# Patient Record
Sex: FEMALE | Race: WHITE | Employment: FULL TIME | URBAN - METROPOLITAN AREA
[De-identification: names, ages, dates, MRNs, and addresses within clinical notes are randomized per-mention and may not be internally consistent; named-entity substitution may affect disease eponyms.]

---

## 2019-10-02 ENCOUNTER — HOSPITAL ENCOUNTER (OUTPATIENT)
Age: 49
Discharge: HOME | End: 2019-10-02
Attending: EMERGENCY MEDICINE
Payer: COMMERCIAL

## 2019-10-02 VITALS
OXYGEN SATURATION: 99 % | RESPIRATION RATE: 18 BRPM | HEART RATE: 91 BPM | TEMPERATURE: 98 F | WEIGHT: 175 LBS | BODY MASS INDEX: 29.16 KG/M2 | SYSTOLIC BLOOD PRESSURE: 124 MMHG | DIASTOLIC BLOOD PRESSURE: 86 MMHG | HEIGHT: 65 IN

## 2019-10-02 DIAGNOSIS — J01.00 ACUTE NON-RECURRENT MAXILLARY SINUSITIS: Primary | ICD-10-CM

## 2019-10-02 PROCEDURE — 99204 OFFICE O/P NEW MOD 45 MIN: CPT

## 2019-10-02 PROCEDURE — 99203 OFFICE O/P NEW LOW 30 MIN: CPT

## 2019-10-02 RX ORDER — AZITHROMYCIN 250 MG/1
TABLET, FILM COATED ORAL
Qty: 1 PACKAGE | Refills: 0 | Status: SHIPPED | OUTPATIENT
Start: 2019-10-02

## 2019-10-02 NOTE — ED PROVIDER NOTES
Patient Seen in: [facility] In [address] [place] [address]    History   Patient presents with:  Cough/URI    Stated Complaint: sinus congestion    HPI      HISTORY OF PRESENT ILLNESS:Patient complains of URI symptoms for 3-4 days.   Complains of sinus [10/02/19 1128]   /86   Pulse 91   Resp 18   Temp 98.2 °F (36.8 °C)   Temp src Oral   SpO2 99 %   O2 Device None (Room air)       Current:/86   Pulse 91   Temp 98.2 °F (36.8 °C) (Oral)   Resp 18   Ht 165.1 cm (5' 5\")   Wt 79.4 kg   LMP 06/13/2

## 2024-04-01 ENCOUNTER — OFFICE VISIT (OUTPATIENT)
Dept: RHEUMATOLOGY | Facility: CLINIC | Age: 54
End: 2024-04-01
Payer: COMMERCIAL

## 2024-04-01 ENCOUNTER — LAB ENCOUNTER (OUTPATIENT)
Dept: LAB | Facility: HOSPITAL | Age: 54
End: 2024-04-01
Attending: INTERNAL MEDICINE
Payer: COMMERCIAL

## 2024-04-01 VITALS
WEIGHT: 172 LBS | SYSTOLIC BLOOD PRESSURE: 138 MMHG | DIASTOLIC BLOOD PRESSURE: 86 MMHG | HEIGHT: 65 IN | BODY MASS INDEX: 28.66 KG/M2 | HEART RATE: 53 BPM

## 2024-04-01 DIAGNOSIS — M79.642 BILATERAL HAND PAIN: ICD-10-CM

## 2024-04-01 DIAGNOSIS — Z13.820 OSTEOPOROSIS SCREENING: ICD-10-CM

## 2024-04-01 DIAGNOSIS — M79.641 BILATERAL HAND PAIN: ICD-10-CM

## 2024-04-01 DIAGNOSIS — Z13.820 OSTEOPOROSIS SCREENING: Primary | ICD-10-CM

## 2024-04-01 LAB
ALP LIVER SERPL-CCNC: 83 U/L
CALCIUM BLD-MCNC: 10 MG/DL (ref 8.7–10.4)
IGA SERPL-MCNC: 152.2 MG/DL (ref 70–312)
PTH-INTACT SERPL-MCNC: 59.8 PG/ML (ref 18.5–88)
VIT D+METAB SERPL-MCNC: 38.8 NG/ML (ref 30–100)

## 2024-04-01 PROCEDURE — 83521 IG LIGHT CHAINS FREE EACH: CPT

## 2024-04-01 PROCEDURE — 82306 VITAMIN D 25 HYDROXY: CPT

## 2024-04-01 PROCEDURE — 86364 TISS TRNSGLTMNASE EA IG CLAS: CPT

## 2024-04-01 PROCEDURE — 84075 ASSAY ALKALINE PHOSPHATASE: CPT

## 2024-04-01 PROCEDURE — 86334 IMMUNOFIX E-PHORESIS SERUM: CPT

## 2024-04-01 PROCEDURE — 82310 ASSAY OF CALCIUM: CPT

## 2024-04-01 PROCEDURE — 83970 ASSAY OF PARATHORMONE: CPT

## 2024-04-01 PROCEDURE — 84165 PROTEIN E-PHORESIS SERUM: CPT

## 2024-04-01 PROCEDURE — 82784 ASSAY IGA/IGD/IGG/IGM EACH: CPT

## 2024-04-01 PROCEDURE — 36415 COLL VENOUS BLD VENIPUNCTURE: CPT

## 2024-04-01 NOTE — PROGRESS NOTES
Maribell Blackburn is a 53 year old female who presents for   Chief Complaint   Patient presents with    Consult    Joint Pain     Selvin Hand joint pain    .   HPI:   CC: joint pain, evaluate for osteoporosis   Consult: self-referred     This is a 52 yo F with hx of Hysterectomy due to Endometriosis in 2016 presents to evaluate for osteoporosis.  She had a hysterectomy in 2016 and was on oral replacement therapy for 2 months.  No prior history of fractures.  No family history of osteoporosis.  Her friend was received diagnosed with osteoporosis and she is concerned if she has this.  She is vegan but consumes a lot of vegetables that have calcium.  She was doing yoga but recently started doing more weightbearing exercises.  She reports some pain in her hands but not severe.  No swelling.  No history of psoriasis.  No back pain.    Osteoporosis screening questions:  History of fractures: no   Family history of fractures/osteoporosis:  no   History of kidney stones: hx of kidney stones, more than 10 years ago  Menopause at age: had hysterectomy in 2016, did HRT for 2 mos   History of steroid use: no  On anticonvulsants: no  Calcium/Vit D intake/supplementation: takes Ca and vitamin D supplements, vegan diet and consume a lot of green vegetable, no dairy products   Weight bearing exercise: exercise, interval training on treadmill and weights   Alcohol consumption: minimal   Tobacco use: no   Last dental: no  Height loss: no   Falls in the past 12 months: no   Hx external beam radiation to bones: no   Currently taking nothing for bone rx.   Other therapies have included: none        Wt Readings from Last 2 Encounters:   04/01/24 172 lb (78 kg)   10/02/19 175 lb (79.4 kg)     Body mass index is 28.62 kg/m².      Current Outpatient Medications   Medication Sig Dispense Refill    Saline (AYR NASAL MIST ALLERGY/SINUS) 2.65 % Nasal Solution 1 spray by Nasal route 3 (three) times daily. 50 mL 0    azithromycin (ZITHROMAX Z-QUANG)  250 MG Oral Tab 500 mg once followed by 250 mg daily x 4 days 1 Package 0    estradiol 1 MG Oral Tab Take 1 tablet (1 mg total) by mouth daily. 30 tablet 11      Past Medical History:   Diagnosis Date    Menometrorrhagia 2009    endometrial ablation - novasure    Sterilization 11/11/2004      Past Surgical History:   Procedure Laterality Date    ENDOMETRIAL ABLATION  2009    novasure    OTHER SURGICAL HISTORY  2004    Per NG  Sterilization      Family History   Problem Relation Age of Onset    Diabetes Father     Arthritis Father         osteoarthritis    Cancer Cousin 37        lung-smoker, \"Cousin-raised as si\"    Hypertension Mother       Social History:  Social History     Socioeconomic History    Marital status:    Tobacco Use    Smoking status: Never    Smokeless tobacco: Never   Substance and Sexual Activity    Alcohol use: No     Alcohol/week: 0.0 standard drinks of alcohol     Comment: None.     Drug use: No     Comment: None.     Sexual activity: Yes     Partners: Male     Birth control/protection: Tubal Ligation   Other Topics Concern    Caffeine Concern Yes     Comment: coffee, 3 cups daily           REVIEW OF SYSTEMS:   Review Of Systems:  Constitutional: No fever, no change in weight or appetitie  Derm: No rashes, no oral ulcers, no alopecia, no photosensitivity, no psoriasis  HEENT: No dry eyes, no dry mouth, no Raynaud's, no nasal ulcers, no parotid swelling, no neck pain, no jaw pain, no temple pain  Eyes: No visual changes,   CVS: No chest pain, no heart disease  RS: No SOB, no Cough, No Pleurtic pain,   GI: No nausea, no vomiiting, no abominal pain, no hx of ulcer, no gastritis, no heartburn, no dyshpagia, no BRBPR or melena  : no dysuria, no hx of miscarriages, no DVT Hx, no hx of OCP,   Neuro: No numbness or tingling, no headache, no hx of seizures,   Psych: no hx of anxiety or depression  ENDO: no hx of thyroid disease, no hx of DM  Joint/Muscluskeltal: see HPI,   All other ROS are  negative.     EXAM:   Ht 5' 5\" (1.651 m)   Wt 172 lb (78 kg)   LMP 06/13/2016 (Within Days)   BMI 28.62 kg/m²   GEN: AAOx3, NAD  HEENT: EOMI, PERRLA, no injection or icterus, oral mucosa moist, no oral lesions. No lymphadenopathy. No facial rash  CVS: RRR, no murmurs rubs or gallops. Equal 2+ distal pulses.   LUNGS: CTAB, no increased work of breathing  ABDOMEN:  soft NT/ND, +BS, no HSM  SKIN: No rashes or skin lesions. No nail findings  MSK:  Cervical spine: FROM  Hands: no synovitis in DIP, PIP and MCP, strong full fists  Wrist: FROM, no pain or swelling or warmth on palpation  Elbow: FROM, no pain or swelling or warmth on palpation  Shoulders: FROM, no pain or swelling or warmth on palpation  Hip: normal log roll, no lateral hip pain, NIALL test negative b/l  Knees: FROM, no warmth or effusion present. No pain with ROM.   Ankles: FROM, no pain or swelling or warmth on palpation  Feet: no pain with MTP squeeze, no toe swelling or pain or warmth on palpation with FROM  Spine: no lumbar or sacral pain on palpation.  NEURO: Cranial nerves II-XII intact grossly. 5/5 strength throughout in both upper and lower extremities, sensation intact.  PSYCH: normal mood    LABS:     None    IMAGING:     Reviewed     ASSESSMENT AND PLAN:     Osteoporosis screening  - She had hysterectomy in 2016 and was on home replacement therapy for 2 months.  She is vegan but does consume vegetables to have calcium.  She does weightbearing exercises.  - Plan to obtain further blood work  - We will order a bone density  - Advised to start taking calcium 6 or milligrams daily and vitamin D 2000 units daily.  She will continue weightbearing exercises    Polyarthralgias, mostly her hands  - No evidence of inflammatory arthritis, synovitis on exam.    Thank you for allowing me to participate in this patients care. Pt will be notified of results and to follow-up as needed    Ilda Solano MD  4/1/2024  10:50 AM

## 2024-04-01 NOTE — PATIENT INSTRUCTIONS
You were seen today for evaluation of osteoporosis and some joint pain  Lets get further test to evaluate for osteoporosis  Will also have you get a bone density  Depending the results we will discuss treatment options if there is any evidence of osteoporosis  Take at least 500 to 600 mg of calcium supplement today and around 2000 units of vitamin D  Continue weightbearing exercises which helps build bone

## 2024-04-02 LAB
ALBUMIN SERPL ELPH-MCNC: 4.41 G/DL (ref 3.75–5.21)
ALBUMIN/GLOB SERPL: 1.92 {RATIO} (ref 1–2)
ALPHA1 GLOB SERPL ELPH-MCNC: 0.26 G/DL (ref 0.19–0.46)
ALPHA2 GLOB SERPL ELPH-MCNC: 0.6 G/DL (ref 0.48–1.05)
B-GLOBULIN SERPL ELPH-MCNC: 0.75 G/DL (ref 0.68–1.23)
GAMMA GLOB SERPL ELPH-MCNC: 0.68 G/DL (ref 0.62–1.7)
KAPPA LC FREE SER-MCNC: 1.57 MG/DL (ref 0.33–1.94)
KAPPA LC FREE/LAMBDA FREE SER NEPH: 1.74 {RATIO} (ref 0.26–1.65)
LAMBDA LC FREE SERPL-MCNC: 0.91 MG/DL (ref 0.57–2.63)
PROT SERPL-MCNC: 6.7 G/DL (ref 5.7–8.2)
TTG IGA SER-ACNC: 0.3 U/ML (ref ?–7)

## 2024-04-04 ENCOUNTER — HOSPITAL ENCOUNTER (OUTPATIENT)
Dept: BONE DENSITY | Age: 54
Discharge: HOME | End: 2024-04-04
Attending: INTERNAL MEDICINE
Payer: COMMERCIAL

## 2024-04-04 DIAGNOSIS — Z13.820 OSTEOPOROSIS SCREENING: ICD-10-CM

## 2024-04-04 PROCEDURE — 77080 DXA BONE DENSITY AXIAL: CPT | Performed by: INTERNAL MEDICINE

## 2024-12-09 ENCOUNTER — APPOINTMENT (OUTPATIENT)
Dept: CT IMAGING | Facility: HOSPITAL | Age: 54
End: 2024-12-09
Attending: EMERGENCY MEDICINE
Payer: COMMERCIAL

## 2024-12-09 ENCOUNTER — HOSPITAL ENCOUNTER (EMERGENCY)
Facility: HOSPITAL | Age: 54
Discharge: HOME | End: 2024-12-09
Attending: EMERGENCY MEDICINE
Payer: COMMERCIAL

## 2024-12-09 VITALS
HEART RATE: 96 BPM | SYSTOLIC BLOOD PRESSURE: 136 MMHG | BODY MASS INDEX: 23.3 KG/M2 | OXYGEN SATURATION: 100 % | DIASTOLIC BLOOD PRESSURE: 59 MMHG | RESPIRATION RATE: 16 BRPM | TEMPERATURE: 98 F | WEIGHT: 145 LBS | HEIGHT: 66 IN

## 2024-12-09 DIAGNOSIS — N20.0 KIDNEY STONE ON LEFT SIDE: Primary | ICD-10-CM

## 2024-12-09 LAB
ALBUMIN SERPL-MCNC: 4.7 G/DL (ref 3.2–4.8)
ALBUMIN/GLOB SERPL: 2 {RATIO} (ref 1–2)
ALP LIVER SERPL-CCNC: 101 U/L
ALT SERPL-CCNC: 16 U/L
ANION GAP SERPL CALC-SCNC: 11 MMOL/L (ref 0–18)
AST SERPL-CCNC: 20 U/L (ref ?–34)
BASOPHILS # BLD AUTO: 0.05 X10(3) UL (ref 0–0.2)
BASOPHILS NFR BLD AUTO: 0.5 %
BILIRUB SERPL-MCNC: 1.1 MG/DL (ref 0.3–1.2)
BILIRUB UR QL: NEGATIVE
BUN BLD-MCNC: 18 MG/DL (ref 9–23)
BUN/CREAT SERPL: 34 (ref 10–20)
CALCIUM BLD-MCNC: 9.7 MG/DL (ref 8.7–10.4)
CHLORIDE SERPL-SCNC: 107 MMOL/L (ref 98–112)
CO2 SERPL-SCNC: 24 MMOL/L (ref 21–32)
COLOR UR: YELLOW
CREAT BLD-MCNC: 0.53 MG/DL
DEPRECATED RDW RBC AUTO: 38.5 FL (ref 35.1–46.3)
EGFRCR SERPLBLD CKD-EPI 2021: 109 ML/MIN/1.73M2 (ref 60–?)
EOSINOPHIL # BLD AUTO: 0.02 X10(3) UL (ref 0–0.7)
EOSINOPHIL NFR BLD AUTO: 0.2 %
ERYTHROCYTE [DISTWIDTH] IN BLOOD BY AUTOMATED COUNT: 12.7 % (ref 11–15)
GLOBULIN PLAS-MCNC: 2.3 G/DL (ref 2–3.5)
GLUCOSE BLD-MCNC: 122 MG/DL (ref 70–99)
GLUCOSE UR-MCNC: NORMAL MG/DL
HCT VFR BLD AUTO: 39.7 %
HGB BLD-MCNC: 14 G/DL
IMM GRANULOCYTES # BLD AUTO: 0.04 X10(3) UL (ref 0–1)
IMM GRANULOCYTES NFR BLD: 0.4 %
KETONES UR-MCNC: 60 MG/DL
LEUKOCYTE ESTERASE UR QL STRIP.AUTO: NEGATIVE
LYMPHOCYTES # BLD AUTO: 1.22 X10(3) UL (ref 1–4)
LYMPHOCYTES NFR BLD AUTO: 11.5 %
MCH RBC QN AUTO: 29.3 PG (ref 26–34)
MCHC RBC AUTO-ENTMCNC: 35.3 G/DL (ref 31–37)
MCV RBC AUTO: 83.1 FL
MONOCYTES # BLD AUTO: 0.4 X10(3) UL (ref 0.1–1)
MONOCYTES NFR BLD AUTO: 3.8 %
NEUTROPHILS # BLD AUTO: 8.88 X10 (3) UL (ref 1.5–7.7)
NEUTROPHILS # BLD AUTO: 8.88 X10(3) UL (ref 1.5–7.7)
NEUTROPHILS NFR BLD AUTO: 83.6 %
NITRITE UR QL STRIP.AUTO: NEGATIVE
OSMOLALITY SERPL CALC.SUM OF ELEC: 297 MOSM/KG (ref 275–295)
PH UR: 8.5 [PH] (ref 5–8)
PLATELET # BLD AUTO: 267 10(3)UL (ref 150–450)
POTASSIUM SERPL-SCNC: 4.1 MMOL/L (ref 3.5–5.1)
PROT SERPL-MCNC: 7 G/DL (ref 5.7–8.2)
PROT UR-MCNC: 100 MG/DL
RBC # BLD AUTO: 4.78 X10(6)UL
RBC #/AREA URNS AUTO: >10 /HPF
SODIUM SERPL-SCNC: 142 MMOL/L (ref 136–145)
SP GR UR STRIP: 1.03 (ref 1–1.03)
UROBILINOGEN UR STRIP-ACNC: NORMAL
WBC # BLD AUTO: 10.6 X10(3) UL (ref 4–11)

## 2024-12-09 PROCEDURE — 74176 CT ABD & PELVIS W/O CONTRAST: CPT | Performed by: EMERGENCY MEDICINE

## 2024-12-09 PROCEDURE — 96375 TX/PRO/DX INJ NEW DRUG ADDON: CPT

## 2024-12-09 PROCEDURE — 81001 URINALYSIS AUTO W/SCOPE: CPT | Performed by: EMERGENCY MEDICINE

## 2024-12-09 PROCEDURE — 96361 HYDRATE IV INFUSION ADD-ON: CPT

## 2024-12-09 PROCEDURE — 99285 EMERGENCY DEPT VISIT HI MDM: CPT

## 2024-12-09 PROCEDURE — 80053 COMPREHEN METABOLIC PANEL: CPT | Performed by: EMERGENCY MEDICINE

## 2024-12-09 PROCEDURE — 99284 EMERGENCY DEPT VISIT MOD MDM: CPT

## 2024-12-09 PROCEDURE — 85025 COMPLETE CBC W/AUTO DIFF WBC: CPT | Performed by: EMERGENCY MEDICINE

## 2024-12-09 PROCEDURE — 96374 THER/PROPH/DIAG INJ IV PUSH: CPT

## 2024-12-09 RX ORDER — ONDANSETRON 2 MG/ML
4 INJECTION INTRAMUSCULAR; INTRAVENOUS ONCE
Status: COMPLETED | OUTPATIENT
Start: 2024-12-09 | End: 2024-12-09

## 2024-12-09 RX ORDER — KETOROLAC TROMETHAMINE 30 MG/ML
30 INJECTION, SOLUTION INTRAMUSCULAR; INTRAVENOUS ONCE
Status: COMPLETED | OUTPATIENT
Start: 2024-12-09 | End: 2024-12-09

## 2024-12-09 RX ORDER — HYDROCODONE BITARTRATE AND ACETAMINOPHEN 5; 325 MG/1; MG/1
1 TABLET ORAL ONCE
Status: COMPLETED | OUTPATIENT
Start: 2024-12-09 | End: 2024-12-09

## 2024-12-09 RX ORDER — TAMSULOSIN HYDROCHLORIDE 0.4 MG/1
0.4 CAPSULE ORAL DAILY
Qty: 5 CAPSULE | Refills: 0 | Status: SHIPPED | OUTPATIENT
Start: 2024-12-09 | End: 2024-12-12

## 2024-12-09 RX ORDER — HYDROCODONE BITARTRATE AND ACETAMINOPHEN 5; 325 MG/1; MG/1
1 TABLET ORAL EVERY 4 HOURS PRN
Qty: 20 TABLET | Refills: 0 | Status: SHIPPED | OUTPATIENT
Start: 2024-12-09 | End: 2024-12-11

## 2024-12-09 RX ORDER — ONDANSETRON 2 MG/ML
INJECTION INTRAMUSCULAR; INTRAVENOUS
Status: DISCONTINUED
Start: 2024-12-09 | End: 2024-12-09

## 2024-12-09 NOTE — ED PROVIDER NOTES
Patient Seen in: [facility] Emergency Department      History     Chief Complaint   Patient presents with   • Abdominal Pain     Stated Complaint: Flank pain,N/V/D    Subjective:   HPI      The patient is a 54-year-old female who presents with left lower quadrant abdominal pain since 1 AM.  Pain does not radiate.  She also has had multiple episodes of vomiting and loose stools.  No fevers or chills.  No dysuria urgency frequency or hematuria.  No known sick contacts or recent travel.    Objective:     Past Medical History:   • Menometrorrhagia    endometrial ablation - novasure   • Sterilization              Past Surgical History:   Procedure Laterality Date   • Endometrial ablation  2009    novasure   • Other surgical history  2004    Per NG  Sterilization                Social History     Socioeconomic History   • Marital status:    Tobacco Use   • Smoking status: Never   • Smokeless tobacco: Never   Substance and Sexual Activity   • Alcohol use: No     Alcohol/week: 0.0 standard drinks of alcohol     Comment: None.    • Drug use: No     Comment: None.    • Sexual activity: Yes     Partners: Male     Birth control/protection: Tubal Ligation   Other Topics Concern   • Caffeine Concern Yes     Comment: coffee, 3 cups daily                  Physical Exam     ED Triage Vitals [12/09/24 1545]   BP 93/47   Pulse 61   Resp 20   Temp 97.8 °F (36.6 °C)   Temp src Temporal   SpO2 100 %   O2 Device None (Room air)       Current Vitals:   Vital Signs  BP: 136/59  Pulse: 96  Resp: 16  Temp: 98.1 °F (36.7 °C)  Temp src: Oral    Oxygen Therapy  SpO2: 100 %  O2 Device: None (Room air)        Physical Exam  Vitals and nursing note reviewed.   Constitutional:       General: She is not in acute distress.     Appearance: Normal appearance. She is well-developed. She is not ill-appearing.   HENT:      Head: Normocephalic and atraumatic.      Mouth/Throat:      Mouth: Mucous membranes are moist.   Eyes:      Extraocular  Movements: Extraocular movements intact.      Conjunctiva/sclera: Conjunctivae normal.      Pupils: Pupils are equal, round, and reactive to light.   Neck:      Vascular: No JVD.   Cardiovascular:      Rate and Rhythm: Normal rate and regular rhythm.      Heart sounds: Normal heart sounds. No murmur heard.  Pulmonary:      Effort: Pulmonary effort is normal.      Breath sounds: Normal breath sounds.   Abdominal:      General: Bowel sounds are normal. There is no distension.      Palpations: Abdomen is soft. There is no mass.      Tenderness: There is abdominal tenderness in the left lower quadrant. There is no right CVA tenderness, left CVA tenderness, guarding or rebound.   Musculoskeletal:         General: Normal range of motion.      Cervical back: Normal range of motion and neck supple.   Skin:     General: Skin is warm and dry.      Capillary Refill: Capillary refill takes less than 2 seconds.      Findings: No rash.   Neurological:      General: No focal deficit present.      Mental Status: She is alert and oriented to person, place, and time.      Deep Tendon Reflexes: Reflexes are normal and symmetric.   Psychiatric:         Judgment: Judgment normal.     Differential diagnosis includes but is not limited to diverticulitis, UTI, kidney stone, colitis,    ED Course     Labs Reviewed   CBC WITH DIFFERENTIAL WITH PLATELET - Abnormal; Notable for the following components:       Result Value    Neutrophil Absolute Prelim 8.88 (*)     Neutrophil Absolute 8.88 (*)     All other components within normal limits   COMP METABOLIC PANEL (14) - Abnormal; Notable for the following components:    Glucose 122 (*)     Creatinine 0.53 (*)     BUN/CREA Ratio 34.0 (*)     Calculated Osmolality 297 (*)     All other components within normal limits   URINALYSIS, ROUTINE - Abnormal; Notable for the following components:    Clarity Urine Turbid (*)     Ketones Urine 60 (*)     Blood Urine 3+ (*)     pH Urine 8.5 (*)     Protein  Urine 100 (*)     RBC Urine >10 (*)     Bacteria Urine Rare (*)     Squamous Epi. Cells Few (*)     All other components within normal limits   RAINBOW DRAW LAVENDER   RAINBOW DRAW LIGHT GREEN   RAINBOW DRAW BLUE                   MDM              Medical Decision Making  Patient with obstructing 8 mm proximal left stone controlled with [place]  Home with Flomax [place] and referral to urology  Return precautions given  Patient comfortable discharge plan    Problems Addressed:  Kidney stone on left side: acute illness or injury    Amount and/or Complexity of Data Reviewed  Labs: ordered.  Radiology: ordered and independent interpretation performed.     Details: Left hydronephrosis other results per radiologist    Risk  Prescription drug management.  Risk Details: Dosage and side effects discussed with patient        Disposition and Plan     Clinical Impression:  1. Kidney stone on left side         Disposition:  Discharge  12/9/2024  7:10 pm    Follow-up:  Sebastian Corbin MD  [address] [place] [id number]  [phone]    Schedule an appointment as soon as possible for a visit in 2 day(s)            Medications Prescribed:  Discharge Medication List as of 12/9/2024  7:15 PM        START taking these medications    Details   tamsulosin (FLOMAX) 0.4 MG Oral Cap Take 1 capsule (0.4 mg total) by mouth daily for 5 days., Normal, Disp-5 capsule, R-0      HYDROcodone-acetaminophen 5-325 MG Oral Tab Take 1 tablet by mouth every 4 (four) hours as needed for Pain., Normal, Disp-20 tablet, R-0                 Supplementary Documentation:

## 2024-12-10 ENCOUNTER — TELEPHONE (OUTPATIENT)
Dept: SURGERY | Facility: CLINIC | Age: 54
End: 2024-12-10

## 2024-12-10 NOTE — TELEPHONE ENCOUNTER
Patient was at ER yesterday and was told she has a 8mm stone was told to follow up with urology within 2 days. No openings until 12/30 .Please advise

## 2024-12-10 NOTE — TELEPHONE ENCOUNTER
Confirmed: 12/10/2024 9:39 AM  12/10/2024 10:16 AM By:  By:   GENERIC, MYCHART [[email or web address]] (PtMobApp)

## 2024-12-10 NOTE — DISCHARGE INSTRUCTIONS
Call urology tomorrow to tell them you have an 8 mm proximal stone  Take Flomax daily until stone passes  Norco every 4 hours as needed for pain  Return if increased pain fever vomiting

## 2024-12-10 NOTE — TELEPHONE ENCOUNTER
Left message for patient, tentatively scheduled patient for consult for tomorrow at 2pm. RAGINI's if patient, calls back please confirm if patient can make appointment.     Future Appointments   Date Time Provider Department Center   12/11/2024  2:00 PM Tanmay Sommer MD [facility]

## 2024-12-11 ENCOUNTER — LAB ENCOUNTER (OUTPATIENT)
Dept: LAB | Facility: HOSPITAL | Age: 54
End: 2024-12-11
Attending: UROLOGY
Payer: COMMERCIAL

## 2024-12-11 ENCOUNTER — TELEPHONE (OUTPATIENT)
Dept: SURGERY | Facility: CLINIC | Age: 54
End: 2024-12-11

## 2024-12-11 ENCOUNTER — OFFICE VISIT (OUTPATIENT)
Dept: SURGERY | Facility: CLINIC | Age: 54
End: 2024-12-11

## 2024-12-11 VITALS
DIASTOLIC BLOOD PRESSURE: 78 MMHG | BODY MASS INDEX: 26.33 KG/M2 | WEIGHT: 158 LBS | SYSTOLIC BLOOD PRESSURE: 140 MMHG | HEIGHT: 65 IN | HEART RATE: 52 BPM

## 2024-12-11 DIAGNOSIS — Z79.01 MONITORING FOR ANTICOAGULANT USE: ICD-10-CM

## 2024-12-11 DIAGNOSIS — N23 RENAL COLIC ON LEFT SIDE: ICD-10-CM

## 2024-12-11 DIAGNOSIS — N13.2 URETERAL STONE WITH HYDRONEPHROSIS: ICD-10-CM

## 2024-12-11 DIAGNOSIS — N20.1 LEFT URETERAL STONE: ICD-10-CM

## 2024-12-11 DIAGNOSIS — N13.2 URETERAL STONE WITH HYDRONEPHROSIS: Primary | ICD-10-CM

## 2024-12-11 DIAGNOSIS — Z01.818 PREOPERATIVE TESTING: ICD-10-CM

## 2024-12-11 DIAGNOSIS — Z51.81 MONITORING FOR ANTICOAGULANT USE: ICD-10-CM

## 2024-12-11 DIAGNOSIS — N20.0 KIDNEY STONE ON LEFT SIDE: ICD-10-CM

## 2024-12-11 DIAGNOSIS — N20.1 LEFT URETERAL STONE: Primary | ICD-10-CM

## 2024-12-11 LAB
APTT PPP: 29.3 SECONDS (ref 23–36)
ATRIAL RATE: 50 BPM
INR BLD: 1.01 (ref 0.8–1.2)
P AXIS: 57 DEGREES
P-R INTERVAL: 140 MS
PROTHROMBIN TIME: 13.9 SECONDS (ref 11.6–14.8)
Q-T INTERVAL: 430 MS
QRS DURATION: 100 MS
QTC CALCULATION (BEZET): 392 MS
R AXIS: 14 DEGREES
T AXIS: 55 DEGREES
VENTRICULAR RATE: 50 BPM

## 2024-12-11 PROCEDURE — 3008F BODY MASS INDEX DOCD: CPT | Performed by: UROLOGY

## 2024-12-11 PROCEDURE — 36415 COLL VENOUS BLD VENIPUNCTURE: CPT

## 2024-12-11 PROCEDURE — 3077F SYST BP >= 140 MM HG: CPT | Performed by: UROLOGY

## 2024-12-11 PROCEDURE — 85610 PROTHROMBIN TIME: CPT

## 2024-12-11 PROCEDURE — 93010 ELECTROCARDIOGRAM REPORT: CPT | Performed by: INTERNAL MEDICINE

## 2024-12-11 PROCEDURE — 3078F DIAST BP <80 MM HG: CPT | Performed by: UROLOGY

## 2024-12-11 PROCEDURE — 99204 OFFICE O/P NEW MOD 45 MIN: CPT | Performed by: UROLOGY

## 2024-12-11 PROCEDURE — 85730 THROMBOPLASTIN TIME PARTIAL: CPT

## 2024-12-11 PROCEDURE — 93005 ELECTROCARDIOGRAM TRACING: CPT

## 2024-12-11 RX ORDER — HYDROCODONE BITARTRATE AND ACETAMINOPHEN 5; 325 MG/1; MG/1
1 TABLET ORAL EVERY 6 HOURS PRN
Qty: 20 TABLET | Refills: 0 | Status: SHIPPED | OUTPATIENT
Start: 2024-12-11

## 2024-12-11 NOTE — H&P (VIEW-ONLY)
[facility] Urology  Initial Office Consultation    HPI:   Maribell Blackburn is a 54 year old female here today for consultation following a recent ER visit where she saw Dr. Alyson Aguillon.    1.  Left ureteral stone  Patient with previous history of nephrolithiasis back in 2015 where she passed a kidney stone.    She presented to the ER on 12/9/2024 complaining of sudden onset severe left lower quadrant abdominal pain.  Associated with nausea and vomiting.  It was colicky in nature.  It radiated to the upper abdomen.  No fevers or chills.  Noticed some gross hematuria.    Imaging included a CT scan of the abdomen and pelvis without contrast which revealed an 8 mm obstructing left proximal ureteral stone with moderate left hydroureteronephrosis.  Small volume perinephric fluid fluid and perinephric/periureteric inflammatory changes.    She has a normal WBC count and normal creatinine.    Urinalysis reveals 3+ blood, no leuks or nitrites.  Rare bacteria.    She was discharged home.    She has been having intermittent left-sided abdominal pain since then.  She has not passed the stone.  She is requiring Norco around-the-clock for her pain.  She denies fevers or chills.      PAST MEDICAL HISTORY: Nephrolithiasis    PAST SURGICAL HISTORY: Hysterectomy.  Laparoscopy.  Tubal ligation.    SOCIAL HISTORY:  and has 2 children.  No smoking or illicit drug use.  No alcohol.  Works as a .     Family History   Problem Relation Age of Onset    Diabetes Father     Arthritis Father         osteoarthritis    Cancer Cousin 37        lung-smoker, \"Cousin-raised as si\"    Hypertension Mother      Allergies: Morphine      REVIEW OF SYSTEMS:  Pertinent positives and negatives per HPI. A 12-point ROS was performed and is otherwise negative.       EXAM:  /78 (BP Location: Left arm, Patient Position: Sitting, Cuff Size: adult)   Pulse 52   Ht 5' 5\" (1.651 m)   Wt 158 lb (71.7 kg)   LMP  06/13/2016 (Within Days)   BMI 26.29 kg/m²     Physical Exam  Constitutional:       General: She is not in acute distress.     Appearance: She is well-developed.   HENT:      Head: Normocephalic.   Eyes:      General: No scleral icterus.  Cardiovascular:      Rate and Rhythm: Normal rate.   Pulmonary:      Effort: Pulmonary effort is normal.   Abdominal:      General: There is no distension.      Palpations: Abdomen is soft.      Tenderness: There is no abdominal tenderness. There is no left CVA tenderness.   Skin:     General: Skin is warm and dry.   Neurological:      Mental Status: She is alert and oriented to person, place, and time.   Psychiatric:         Mood and Affect: Mood normal.         Behavior: Behavior normal.       LABS:  See HPI for details.      IMAGING:    CT ABDOMEN+PELVIS KIDNEYSTONE 2D RNDR(NO IV,NO ORAL)(CPT=74176)    Result Date: 12/9/2024  PROCEDURE:   CT ABDOMEN + PELVIS KIDNEYSTONE 2D RNDR (NO IV NO ORAL) (CPT=74176)  COMPARISON: [facility], CT PF RENAL STONE ABD/P WO CON, 6/27/2015, 8:35 PM.  [facility], CT ABDOMEN PELVIS W CON, 7/16/2016, 1:20 PM.  INDICATIONS: LLQ abdominal pain.  TECHNIQUE: Multidetector CT images of the abdomen and pelvis were obtained without intravenous contrast material. No oral contrast was ingested. Automated exposure control for dose reduction was used. Adjustment of the mA and/or kV was done based on the patient's size. Iterative reconstruction technique for dose reduction was employed.  FINDINGS: COMMENT: Evaluation of the vasculature and of the abdominal viscera for the presence of intraparenchymal pathology is suboptimal in the absence of contrast infusion. LUNG BASES: The heart is normal in size. There is no visible pulmonary or pleural disease. KIDNEYS:   Moderate left hydroureteronephrosis, which is related to an obstructing 8 mm calculus that is located just below the left ureteropelvic junction.  Left perinephric  inflammatory stranding with small volume left perinephric free fluid.  There is  no right hydronephrosis.  Punctate 2 mm nonobstructing right interpolar renal calculus. URINARY BLADDER: Incompletely distended and suboptimally evaluated. LIVER: No enlargement, atrophy, abnormal density, or significant focal lesion is identified within the parameters of this noncontrast study.  BILIARY: The gallbladder is present. PANCREAS: Negative unenhanced appearance for fluid collection, ductal dilatation, or atrophy.  SPLEEN: No enlargement.  ADRENALS:   No defined mass or abnormal enlargement.  GI/MESENTERY:  There is no evidence of bowel obstruction.  Mild gastric distention noted.  Please note that segments of the colon are incompletely distended and suboptimally evaluated, mild colonic fecal burden.  Normal appendix. PELVIC NODES: No lymphadenopathy.   PELVIC ORGANS: Hysterectomy. VASCULATURE:   No aneurysm is detected. RETROPERITONEUM: No mass or lymphadenopathy is apparent.  BONES:   Lower lumbar spine degenerative changes. ABDOMINAL WALL: Tiny fat containing umbilical hernia. OTHER: No free air or fluid is seen in the abdomen or pelvis.  Scattered sclerotic foci likely reflect bone islands; sacralization of L5 on the right.         CONCLUSION:  1. Moderate left hydroureteronephrosis, which is related to an obstructing 8 mm calculus at or just below the left ureteropelvic junction.  Small volume left perinephric free fluid raises suspicion for an element of early forniceal rupture. Left perinephric/periureteric inflammatory change is most compatible with obstructive uropathy; nonetheless, request urinalysis correlation to exclude coexistent ascending urinary tract infection. 2. Additional punctate nonobstructing right interpolar renal calculus. 3. Hysterectomy. 4. Lesser incidental findings as above.   elm-remote  Dictated by (CST): Pablito Alfaro MD on 12/09/2024 at 6:56 PM     Finalized by (CST): Pablito Alfaro MD on  12/09/2024 at 7:01 PM            IMPRESSION:  54 year old female with an obstructing, symptomatic 8 mm left proximal ureteral stone initially diagnosed 12/9/2024.    Patient continues to have symptoms and has not passed the stone.    Different stone management options were discussed including continued trial of medical expulsive therapy, ESWL with or without pre-stenting, ureteroscopy with laser lithotripsy.     Rationale and approach as well as benefits, risks, possible complications and reasonable alternatives of each treatment were discussed with the patient.  Stone clearance rates were discussed as well as the expected postoperative course of recovery.     We discussed the eventual need for stent removal which is usually performed in the office setting.    We discussed risks of surgery including but not limited to medical and anesthetic complications, bleeding, infection, damage to surrounding organs or structures, ureteral injury, stricture formation, and need for additional procedures or staged approach in case of a narrow ureter or signs of infection during initial procedure.     Patient verbalized understanding. All questions were answered.    She elects to proceed with left ureteroscopy, laser lithotripsy, stone removal, stent insertion.      PLAN:  1.  Patient will be scheduled for cystoscopy, left retrograde pyelography, left ureteroscopy, laser lithotripsy, stone removal, ureteral stent insertion.    Urine culture.  Routine preop testing.    Tanmay Sommer MD  12/11/2024

## 2024-12-11 NOTE — H&P
[facility] Urology  Initial Office Consultation    HPI:   Maribell Blackburn is a 54 year old female here today for consultation following a recent ER visit where she saw Dr. Alyson Aguillon.    1.  Left ureteral stone  Patient with previous history of nephrolithiasis back in 2015 where she passed a kidney stone.    She presented to the ER on 12/9/2024 complaining of sudden onset severe left lower quadrant abdominal pain.  Associated with nausea and vomiting.  It was colicky in nature.  It radiated to the upper abdomen.  No fevers or chills.  Noticed some gross hematuria.    Imaging included a CT scan of the abdomen and pelvis without contrast which revealed an 8 mm obstructing left proximal ureteral stone with moderate left hydroureteronephrosis.  Small volume perinephric fluid fluid and perinephric/periureteric inflammatory changes.    She has a normal WBC count and normal creatinine.    Urinalysis reveals 3+ blood, no leuks or nitrites.  Rare bacteria.    She was discharged home.    She has been having intermittent left-sided abdominal pain since then.  She has not passed the stone.  She is requiring Norco around-the-clock for her pain.  She denies fevers or chills.      PAST MEDICAL HISTORY: Nephrolithiasis    PAST SURGICAL HISTORY: Hysterectomy.  Laparoscopy.  Tubal ligation.    SOCIAL HISTORY:  and has 2 children.  No smoking or illicit drug use.  No alcohol.  Works as a .     Family History   Problem Relation Age of Onset    Diabetes Father     Arthritis Father         osteoarthritis    Cancer Cousin 37        lung-smoker, \"Cousin-raised as si\"    Hypertension Mother      Allergies: Morphine      REVIEW OF SYSTEMS:  Pertinent positives and negatives per HPI. A 12-point ROS was performed and is otherwise negative.       EXAM:  /78 (BP Location: Left arm, Patient Position: Sitting, Cuff Size: adult)   Pulse 52   Ht 5' 5\" (1.651 m)   Wt 158 lb (71.7 kg)   LMP  06/13/2016 (Within Days)   BMI 26.29 kg/m²     Physical Exam  Constitutional:       General: She is not in acute distress.     Appearance: She is well-developed.   HENT:      Head: Normocephalic.   Eyes:      General: No scleral icterus.  Cardiovascular:      Rate and Rhythm: Normal rate.   Pulmonary:      Effort: Pulmonary effort is normal.   Abdominal:      General: There is no distension.      Palpations: Abdomen is soft.      Tenderness: There is no abdominal tenderness. There is no left CVA tenderness.   Skin:     General: Skin is warm and dry.   Neurological:      Mental Status: She is alert and oriented to person, place, and time.   Psychiatric:         Mood and Affect: Mood normal.         Behavior: Behavior normal.       LABS:  See HPI for details.      IMAGING:    CT ABDOMEN+PELVIS KIDNEYSTONE 2D RNDR(NO IV,NO ORAL)(CPT=74176)    Result Date: 12/9/2024  PROCEDURE:   CT ABDOMEN + PELVIS KIDNEYSTONE 2D RNDR (NO IV NO ORAL) (CPT=74176)  COMPARISON: [facility], CT PF RENAL STONE ABD/P WO CON, 6/27/2015, 8:35 PM.  [facility], CT ABDOMEN PELVIS W CON, 7/16/2016, 1:20 PM.  INDICATIONS: LLQ abdominal pain.  TECHNIQUE: Multidetector CT images of the abdomen and pelvis were obtained without intravenous contrast material. No oral contrast was ingested. Automated exposure control for dose reduction was used. Adjustment of the mA and/or kV was done based on the patient's size. Iterative reconstruction technique for dose reduction was employed.  FINDINGS: COMMENT: Evaluation of the vasculature and of the abdominal viscera for the presence of intraparenchymal pathology is suboptimal in the absence of contrast infusion. LUNG BASES: The heart is normal in size. There is no visible pulmonary or pleural disease. KIDNEYS:   Moderate left hydroureteronephrosis, which is related to an obstructing 8 mm calculus that is located just below the left ureteropelvic junction.  Left perinephric  inflammatory stranding with small volume left perinephric free fluid.  There is  no right hydronephrosis.  Punctate 2 mm nonobstructing right interpolar renal calculus. URINARY BLADDER: Incompletely distended and suboptimally evaluated. LIVER: No enlargement, atrophy, abnormal density, or significant focal lesion is identified within the parameters of this noncontrast study.  BILIARY: The gallbladder is present. PANCREAS: Negative unenhanced appearance for fluid collection, ductal dilatation, or atrophy.  SPLEEN: No enlargement.  ADRENALS:   No defined mass or abnormal enlargement.  GI/MESENTERY:  There is no evidence of bowel obstruction.  Mild gastric distention noted.  Please note that segments of the colon are incompletely distended and suboptimally evaluated, mild colonic fecal burden.  Normal appendix. PELVIC NODES: No lymphadenopathy.   PELVIC ORGANS: Hysterectomy. VASCULATURE:   No aneurysm is detected. RETROPERITONEUM: No mass or lymphadenopathy is apparent.  BONES:   Lower lumbar spine degenerative changes. ABDOMINAL WALL: Tiny fat containing umbilical hernia. OTHER: No free air or fluid is seen in the abdomen or pelvis.  Scattered sclerotic foci likely reflect bone islands; sacralization of L5 on the right.         CONCLUSION:  1. Moderate left hydroureteronephrosis, which is related to an obstructing 8 mm calculus at or just below the left ureteropelvic junction.  Small volume left perinephric free fluid raises suspicion for an element of early forniceal rupture. Left perinephric/periureteric inflammatory change is most compatible with obstructive uropathy; nonetheless, request urinalysis correlation to exclude coexistent ascending urinary tract infection. 2. Additional punctate nonobstructing right interpolar renal calculus. 3. Hysterectomy. 4. Lesser incidental findings as above.   elm-remote  Dictated by (CST): Pablito Alfaro MD on 12/09/2024 at 6:56 PM     Finalized by (CST): Pablito Alfaro MD on  12/09/2024 at 7:01 PM            IMPRESSION:  54 year old female with an obstructing, symptomatic 8 mm left proximal ureteral stone initially diagnosed 12/9/2024.    Patient continues to have symptoms and has not passed the stone.    Different stone management options were discussed including continued trial of medical expulsive therapy, ESWL with or without pre-stenting, ureteroscopy with laser lithotripsy.     Rationale and approach as well as benefits, risks, possible complications and reasonable alternatives of each treatment were discussed with the patient.  Stone clearance rates were discussed as well as the expected postoperative course of recovery.     We discussed the eventual need for stent removal which is usually performed in the office setting.    We discussed risks of surgery including but not limited to medical and anesthetic complications, bleeding, infection, damage to surrounding organs or structures, ureteral injury, stricture formation, and need for additional procedures or staged approach in case of a narrow ureter or signs of infection during initial procedure.     Patient verbalized understanding. All questions were answered.    She elects to proceed with left ureteroscopy, laser lithotripsy, stone removal, stent insertion.      PLAN:  1.  Patient will be scheduled for cystoscopy, left retrograde pyelography, left ureteroscopy, laser lithotripsy, stone removal, ureteral stent insertion.    Urine culture.  Routine preop testing.    Tanmay Sommer MD  12/11/2024

## 2024-12-11 NOTE — TELEPHONE ENCOUNTER
Pt seen in clinic, scheduled for surgery 12//13/24 w/ Ros.   Preop orders entered -pt to complete today in lab.     Urine culture sample taken at time of clinic visit.     Surgical information sent to pt thru portal.

## 2024-12-11 NOTE — TELEPHONE ENCOUNTER
Urology Surgery Scheduling Request    Location: [facility] OR    Surgeon: ALEXANDER Sommer MD    Asst. Surgeon: N/A    Diagnosis: Left ureteral stone    Procedure: Cystoscopy, left retrograde pyelogram, left ureteroscopy, laser lithotripsy, stone removal, ureteral stent insertion.    Procedure CPT Code (if known): 66452.28317.    Anesthesia: General     Time Frame: Friday, 12/13/2024.    Time needed: 75 minutes.     Special Equipment: Holmium Laser and fluoroscopy C arm.    On Call to OR: Ceftriaxone 1 g IV and gentamicin IV pharmacy to dose.    Admission: Day Surgery    Pre-op Testing: PT/INR, PTT, EKG, and Urine Culture     Need Pre-op Clearance: N/A    Estimated Post Op/Follow Up Appt: TBD for stent removal.    Tanmay Sommer MD  12/11/2024

## 2024-12-12 RX ORDER — VITAMIN B COMPLEX
1 CAPSULE ORAL DAILY
COMMUNITY

## 2024-12-12 RX ORDER — SODIUM CHLORIDE, SODIUM LACTATE, POTASSIUM CHLORIDE, CALCIUM CHLORIDE 600; 310; 30; 20 MG/100ML; MG/100ML; MG/100ML; MG/100ML
INJECTION, SOLUTION INTRAVENOUS CONTINUOUS
Status: DISCONTINUED | OUTPATIENT
Start: 2024-12-12 | End: 2024-12-12

## 2024-12-12 NOTE — DISCHARGE INSTRUCTIONS
HOME INSTRUCTIONS  - Blood in the urine, burning with urination, and the urgency to urinate are normal and expected for 3 to 5 days.  - Make sure you drink enough water to keep the urine light pink to clear.  - Take the prescribed antibiotics as instructed for 3days.  - Take the Pyridium (also called Azo and available over-the-counter) for burning with urination. This medication will make your urine orange in color.  - Dr. Sommer's office will contact you within the next few days to schedule you for the next procedure in 2 to 4 weeks. If you do not receive a call within 5 business days, please call [phone] to schedule the appointment.   - Call or go to the ER for intractable pain, fevers >101 F, shaking chills, nausea and vomiting, chest pain or shortness of breath.    We wish you a safe, speedy, and uneventful recovery.     AMBSURG HOME CARE INSTRUCTIONS: POST-OP ANESTHESIA  The medication that you received for sedation or general anesthesia can last up to 24 hours. Your judgment and reflexes may be altered, even if you feel like your normal self.      We Recommend:   Do not drive any motor vehicle or bicycle   Avoid mowing the lawn, playing sports, or working with power tools/applicances (power saws, electric knives or mixers)   That you have someone stay with you on your first night home   Do not drink alcohol or take sleeping pills or tranquilizers   Do not sign legal documents within 24 hours of your procedure   If you had a nerve block for your surgery, take extra care not to put any pressure on your arm or hand for 24 hours    It is normal:  For you to have a sore throat if you had a breathing tube during surgery (while you were asleep!). The sore throat should get better within 48 hours. You can gargle with warm salt water (1/2 tsp in 4 oz warm water) or use a throat lozenge for comfort  To feel muscle aches or soreness especially in the abdomen, chest or neck. The achy feeling should go away in the  next 24 hours  To feel weak, sleepy or \"wiped out\". Your should start feeling better in the next 24 hours.   To experience mild discomforts such as sore lip or tongue, headache, cramps, gas pains or a bloated feeling in your abdomen.   To experience mild back pain or soreness for a day or two if you had spinal or epidural anesthesia.   If you had laparoscopic surgery, to feel shoulder pain or discomfort on the day of surgery.   For some patients to have nausea after surgery/anesthesia    If you feel nausea or experience vomiting:   Try to move around less.   Eat less than usual or drink only liquids until the next morning   Nausea should resolve in about 24 hours    If you have a problem when you are at home:    Call your surgeons office   Discharge Instructions: After Your Surgery  You’ve just had surgery. During surgery, you were given medicine called anesthesia to keep you relaxed and free of pain. After surgery, you may have some pain or nausea. This is common. Here are some tips for feeling better and getting well after surgery.   Going home  Your healthcare provider will show you how to take care of yourself when you go home. They'll also answer your questions. Have an adult family member or friend drive you home. For the first 24 hours after your surgery:   Don't drive or use heavy equipment.  Don't make important decisions or sign legal papers.  Take medicines as directed.  Don't drink alcohol.  Have someone stay with you, if needed. They can watch for problems and help keep you safe.  Be sure to go to all follow-up visits with your healthcare provider. And rest after your surgery for as long as your provider tells you to.   Coping with pain  If you have pain after surgery, pain medicine will help you feel better. Take it as directed, before pain becomes severe. Also, ask your healthcare provider or pharmacist about other ways to control pain. This might be with heat, ice, or relaxation. And follow any other  instructions your surgeon or nurse gives you.      Stay on schedule with your medicine.     Tips for taking pain medicine  To get the best relief possible, remember these points:   Pain medicines can upset your stomach. Taking them with a little food may help.  Most pain relievers taken by mouth need at least 20 to 30 minutes to start to work.  Don't wait till your pain becomes severe before you take your medicine. Try to time your medicine so that you can take it before starting an activity. This might be before you get dressed, go for a walk, or sit down for dinner.  Constipation is a common side effect of some pain medicines. Call your healthcare provider before taking any medicines such as laxatives or stool softeners to help ease constipation. Also ask if you should skip any foods. Drinking lots of fluids and eating foods such as fruits and vegetables that are high in fiber can also help. Remember, don't take laxatives unless your surgeon has prescribed them.  Drinking alcohol and taking pain medicine can cause dizziness and slow your breathing. It can even be deadly. Don't drink alcohol while taking pain medicine.  Pain medicine can make you react more slowly to things. Don't drive or run machinery while taking pain medicine.  Your healthcare provider may tell you to take acetaminophen to help ease your pain. Ask them how much you're supposed to take each day. Acetaminophen or other pain relievers may interact with your prescription medicines or other over-the-counter (OTC) medicines. Some prescription medicines have acetaminophen and other ingredients in them. Using both prescription and OTC acetaminophen for pain can cause you to accidentally overdose. Read the labels on your OTC medicines with care. This will help you to clearly know the list of ingredients, how much to take, and any warnings. It may also help you not take too much acetaminophen. If you have questions or don't understand the information,  ask your pharmacist or healthcare provider to explain it to you before you take the OTC medicine.   Managing nausea  Some people have an upset stomach (nausea) after surgery. This is often because of anesthesia, pain, or pain medicine, less movement of food in the stomach, or the stress of surgery. These tips will help you handle nausea and eat healthy foods as you get better. If you were on a special food plan before surgery, ask your healthcare provider if you should follow it while you get better. Check with your provider on how your eating should progress. It may depend on the surgery you had. These general tips may help:   Don't push yourself to eat. Your body will tell you when to eat and how much.  Start off with clear liquids and soup. They're easier to digest.  Next try semi-solid foods as you feel ready. These include mashed potatoes, applesauce, and gelatin.  Slowly move to solid foods. Don’t eat fatty, rich, or spicy foods at first.  Don't force yourself to have 3 large meals a day. Instead eat smaller amounts more often.  Take pain medicines with a small amount of solid food, such as crackers or toast. This helps prevent nausea.  When to call your healthcare provider  Call your healthcare provider right away if you have any of these:   You still have too much pain, or the pain gets worse, after taking the medicine. The medicine may not be strong enough. Or there may be a complication from the surgery.  You feel too sleepy, dizzy, or groggy. The medicine may be too strong.  Side effects such as nausea or vomiting. Your healthcare provider may advise taking other medicines to .  Skin changes such as rash, itching, or hives. This may mean you have an allergic reaction. Your provider may advise taking other medicines.  The incision looks different (for instance, part of it opens up).  Bleeding or fluid leaking from the incision site, and weren't told to expect that.  Fever of 100.4°F (38°C) or higher, or as  directed by your provider.  Call 911  Call 911 right away if you have:   Trouble breathing  Facial swelling    If you have obstructive sleep apnea   You were given anesthesia medicine during surgery to keep you comfortable and free of pain. After surgery, you may have more apnea spells because of this medicine and other medicines you were given. The spells may last longer than normal.    At home:  Keep using the continuous positive airway pressure (CPAP) device when you sleep. Unless your healthcare provider tells you not to, use it when you sleep, day or night. CPAP is a common device used to treat obstructive sleep apnea.  Talk with your provider before taking any pain medicine, muscle relaxants, or sedatives. Your provider will tell you about the possible dangers of taking these medicines.  Contact your provider if your sleeping changes a lot even when taking medicines as directed.  [employer] last reviewed this educational content on 10/1/2021  © 9736-1776 [employer]. All rights reserved. This information is not intended as a substitute for professional medical care. Always follow your healthcare professional's instructions.

## 2024-12-13 ENCOUNTER — APPOINTMENT (OUTPATIENT)
Dept: GENERAL RADIOLOGY | Facility: HOSPITAL | Age: 54
End: 2024-12-13
Attending: UROLOGY
Payer: COMMERCIAL

## 2024-12-13 ENCOUNTER — TELEPHONE (OUTPATIENT)
Dept: SURGERY | Facility: CLINIC | Age: 54
End: 2024-12-13

## 2024-12-13 ENCOUNTER — HOSPITAL ENCOUNTER (OUTPATIENT)
Facility: HOSPITAL | Age: 54
Setting detail: HOSPITAL OUTPATIENT SURGERY
Discharge: HOME | End: 2024-12-13
Attending: UROLOGY | Admitting: UROLOGY
Payer: COMMERCIAL

## 2024-12-13 ENCOUNTER — ANESTHESIA EVENT (OUTPATIENT)
Dept: SURGERY | Facility: HOSPITAL | Age: 54
End: 2024-12-13
Payer: COMMERCIAL

## 2024-12-13 ENCOUNTER — ANESTHESIA (OUTPATIENT)
Dept: SURGERY | Facility: HOSPITAL | Age: 54
End: 2024-12-13
Payer: COMMERCIAL

## 2024-12-13 VITALS
RESPIRATION RATE: 18 BRPM | WEIGHT: 165 LBS | HEIGHT: 65 IN | OXYGEN SATURATION: 100 % | TEMPERATURE: 98 F | HEART RATE: 63 BPM | SYSTOLIC BLOOD PRESSURE: 137 MMHG | DIASTOLIC BLOOD PRESSURE: 75 MMHG | BODY MASS INDEX: 27.49 KG/M2

## 2024-12-13 DIAGNOSIS — N13.2 URETERAL STONE WITH HYDRONEPHROSIS: ICD-10-CM

## 2024-12-13 DIAGNOSIS — N20.1 LEFT URETERAL STONE: ICD-10-CM

## 2024-12-13 DIAGNOSIS — N20.0 KIDNEY STONE ON LEFT SIDE: ICD-10-CM

## 2024-12-13 DIAGNOSIS — N23 RENAL COLIC ON LEFT SIDE: ICD-10-CM

## 2024-12-13 PROCEDURE — BT1F1ZZ FLUOROSCOPY OF LEFT KIDNEY, URETER AND BLADDER USING LOW OSMOLAR CONTRAST: ICD-10-PCS | Performed by: UROLOGY

## 2024-12-13 PROCEDURE — 74420 UROGRAPHY RTRGR +-KUB: CPT | Performed by: UROLOGY

## 2024-12-13 PROCEDURE — 0T778DZ DILATION OF LEFT URETER WITH INTRALUMINAL DEVICE, VIA NATURAL OR ARTIFICIAL OPENING ENDOSCOPIC: ICD-10-PCS | Performed by: UROLOGY

## 2024-12-13 PROCEDURE — 0TC78ZZ EXTIRPATION OF MATTER FROM LEFT URETER, VIA NATURAL OR ARTIFICIAL OPENING ENDOSCOPIC: ICD-10-PCS | Performed by: UROLOGY

## 2024-12-13 PROCEDURE — 52356 CYSTO/URETERO W/LITHOTRIPSY: CPT | Performed by: UROLOGY

## 2024-12-13 DEVICE — URETERAL STENT
Type: IMPLANTABLE DEVICE | Site: URETER | Status: NON-FUNCTIONAL
Brand: ASCERTA™
Removed: 2024-12-23

## 2024-12-13 RX ORDER — MORPHINE SULFATE 10 MG/ML
6 INJECTION, SOLUTION INTRAMUSCULAR; INTRAVENOUS EVERY 10 MIN PRN
Status: DISCONTINUED | OUTPATIENT
Start: 2024-12-13 | End: 2024-12-13

## 2024-12-13 RX ORDER — ROCURONIUM BROMIDE 10 MG/ML
INJECTION, SOLUTION INTRAVENOUS AS NEEDED
Status: DISCONTINUED | OUTPATIENT
Start: 2024-12-13 | End: 2024-12-13 | Stop reason: SURG

## 2024-12-13 RX ORDER — ONDANSETRON 2 MG/ML
INJECTION INTRAMUSCULAR; INTRAVENOUS AS NEEDED
Status: DISCONTINUED | OUTPATIENT
Start: 2024-12-13 | End: 2024-12-13 | Stop reason: SURG

## 2024-12-13 RX ORDER — SCOLOPAMINE TRANSDERMAL SYSTEM 1 MG/1
1 PATCH, EXTENDED RELEASE TRANSDERMAL ONCE
Status: DISCONTINUED | OUTPATIENT
Start: 2024-12-13 | End: 2024-12-13

## 2024-12-13 RX ORDER — NALOXONE HYDROCHLORIDE 0.4 MG/ML
80 INJECTION, SOLUTION INTRAMUSCULAR; INTRAVENOUS; SUBCUTANEOUS AS NEEDED
Status: DISCONTINUED | OUTPATIENT
Start: 2024-12-13 | End: 2024-12-13

## 2024-12-13 RX ORDER — MIDAZOLAM HYDROCHLORIDE 1 MG/ML
INJECTION INTRAMUSCULAR; INTRAVENOUS AS NEEDED
Status: DISCONTINUED | OUTPATIENT
Start: 2024-12-13 | End: 2024-12-13 | Stop reason: SURG

## 2024-12-13 RX ORDER — LIDOCAINE HYDROCHLORIDE 20 MG/ML
JELLY TOPICAL AS NEEDED
Status: DISCONTINUED | OUTPATIENT
Start: 2024-12-13 | End: 2024-12-13 | Stop reason: HOSPADM

## 2024-12-13 RX ORDER — PHENAZOPYRIDINE HYDROCHLORIDE 200 MG/1
200 TABLET, FILM COATED ORAL ONCE AS NEEDED
Status: CANCELLED | OUTPATIENT
Start: 2024-12-13 | End: 2024-12-13

## 2024-12-13 RX ORDER — SODIUM CHLORIDE, SODIUM LACTATE, POTASSIUM CHLORIDE, CALCIUM CHLORIDE 600; 310; 30; 20 MG/100ML; MG/100ML; MG/100ML; MG/100ML
INJECTION, SOLUTION INTRAVENOUS CONTINUOUS
Status: DISCONTINUED | OUTPATIENT
Start: 2024-12-13 | End: 2024-12-13

## 2024-12-13 RX ORDER — SODIUM CHLORIDE, SODIUM LACTATE, POTASSIUM CHLORIDE, CALCIUM CHLORIDE 600; 310; 30; 20 MG/100ML; MG/100ML; MG/100ML; MG/100ML
INJECTION, SOLUTION INTRAVENOUS CONTINUOUS PRN
Status: DISCONTINUED | OUTPATIENT
Start: 2024-12-13 | End: 2024-12-13 | Stop reason: SURG

## 2024-12-13 RX ORDER — DEXAMETHASONE SODIUM PHOSPHATE 4 MG/ML
VIAL (ML) INJECTION AS NEEDED
Status: DISCONTINUED | OUTPATIENT
Start: 2024-12-13 | End: 2024-12-13 | Stop reason: SURG

## 2024-12-13 RX ORDER — MORPHINE SULFATE 4 MG/ML
2 INJECTION, SOLUTION INTRAMUSCULAR; INTRAVENOUS EVERY 10 MIN PRN
Status: DISCONTINUED | OUTPATIENT
Start: 2024-12-13 | End: 2024-12-13

## 2024-12-13 RX ORDER — LIDOCAINE HYDROCHLORIDE 10 MG/ML
INJECTION, SOLUTION EPIDURAL; INFILTRATION; INTRACAUDAL; PERINEURAL AS NEEDED
Status: DISCONTINUED | OUTPATIENT
Start: 2024-12-13 | End: 2024-12-13 | Stop reason: SURG

## 2024-12-13 RX ORDER — SODIUM CHLORIDE 9 MG/ML
INJECTION, SOLUTION INTRAVENOUS CONTINUOUS
Status: DISCONTINUED | OUTPATIENT
Start: 2024-12-13 | End: 2024-12-13

## 2024-12-13 RX ORDER — MORPHINE SULFATE 4 MG/ML
4 INJECTION, SOLUTION INTRAMUSCULAR; INTRAVENOUS EVERY 10 MIN PRN
Status: DISCONTINUED | OUTPATIENT
Start: 2024-12-13 | End: 2024-12-13

## 2024-12-13 RX ORDER — IOPAMIDOL 612 MG/ML
INJECTION, SOLUTION INTRATHECAL AS NEEDED
Status: DISCONTINUED | OUTPATIENT
Start: 2024-12-13 | End: 2024-12-13 | Stop reason: HOSPADM

## 2024-12-13 RX ORDER — ACETAMINOPHEN 500 MG
1000 TABLET ORAL ONCE
Status: DISCONTINUED | OUTPATIENT
Start: 2024-12-13 | End: 2024-12-13 | Stop reason: HOSPADM

## 2024-12-13 RX ORDER — HYDROMORPHONE HYDROCHLORIDE 1 MG/ML
0.4 INJECTION, SOLUTION INTRAMUSCULAR; INTRAVENOUS; SUBCUTANEOUS EVERY 5 MIN PRN
Status: DISCONTINUED | OUTPATIENT
Start: 2024-12-13 | End: 2024-12-13

## 2024-12-13 RX ORDER — PHENAZOPYRIDINE HYDROCHLORIDE 100 MG/1
100 TABLET, FILM COATED ORAL 3 TIMES DAILY PRN
Qty: 9 TABLET | Refills: 0 | Status: SHIPPED | OUTPATIENT
Start: 2024-12-13

## 2024-12-13 RX ORDER — HYDROMORPHONE HYDROCHLORIDE 1 MG/ML
0.2 INJECTION, SOLUTION INTRAMUSCULAR; INTRAVENOUS; SUBCUTANEOUS EVERY 5 MIN PRN
Status: DISCONTINUED | OUTPATIENT
Start: 2024-12-13 | End: 2024-12-13

## 2024-12-13 RX ORDER — HYDROMORPHONE HYDROCHLORIDE 1 MG/ML
0.6 INJECTION, SOLUTION INTRAMUSCULAR; INTRAVENOUS; SUBCUTANEOUS EVERY 5 MIN PRN
Status: DISCONTINUED | OUTPATIENT
Start: 2024-12-13 | End: 2024-12-13

## 2024-12-13 RX ORDER — SULFAMETHOXAZOLE AND TRIMETHOPRIM 800; 160 MG/1; MG/1
1 TABLET ORAL 2 TIMES DAILY
Qty: 6 TABLET | Refills: 0 | Status: SHIPPED | OUTPATIENT
Start: 2024-12-13 | End: 2024-12-16

## 2024-12-13 RX ADMIN — LIDOCAINE HYDROCHLORIDE 50 MG: 10 INJECTION, SOLUTION EPIDURAL; INFILTRATION; INTRACAUDAL; PERINEURAL at 09:57:00

## 2024-12-13 RX ADMIN — DEXAMETHASONE SODIUM PHOSPHATE 8 MG: 4 MG/ML VIAL (ML) INJECTION at 10:02:00

## 2024-12-13 RX ADMIN — MIDAZOLAM HYDROCHLORIDE 2 MG: 1 INJECTION INTRAMUSCULAR; INTRAVENOUS at 09:53:00

## 2024-12-13 RX ADMIN — SODIUM CHLORIDE, SODIUM LACTATE, POTASSIUM CHLORIDE, CALCIUM CHLORIDE: 600; 310; 30; 20 INJECTION, SOLUTION INTRAVENOUS at 09:49:00

## 2024-12-13 RX ADMIN — ROCURONIUM BROMIDE 50 MG: 10 INJECTION, SOLUTION INTRAVENOUS at 09:58:00

## 2024-12-13 RX ADMIN — SODIUM CHLORIDE, SODIUM LACTATE, POTASSIUM CHLORIDE, CALCIUM CHLORIDE: 600; 310; 30; 20 INJECTION, SOLUTION INTRAVENOUS at 10:56:00

## 2024-12-13 RX ADMIN — ONDANSETRON 4 MG: 2 INJECTION INTRAMUSCULAR; INTRAVENOUS at 10:39:00

## 2024-12-13 NOTE — TELEPHONE ENCOUNTER
Please contact patient and schedule for office cystoscopy and left ureteral stent removal the week of 12/23/24 or 12/30/2024.    Tanmay Sommer MD  12/13/2024

## 2024-12-13 NOTE — ANESTHESIA POSTPROCEDURE EVALUATION
Patient: Maribell Blackburn    Procedure Summary       Date: 12/13/24 Room / Location: [facility] MAIN OR  / [facility] MAIN OR    Anesthesia Start: 0949 Anesthesia Stop: 1109    Procedures:       Cystoscopy, left retrograde pyelogram, left ureteroscopy, laser lithotripsy, stone removal, ureteral stent insertion (Left: Ureter)      CYSTOSCOPY URETEROSCOPY (Left: Ureter)      CYSTOSCOPY STENT INSERTION (Left: Ureter)      LASER HOLMIUM LITHOTRIPSY (Left: Ureter) Diagnosis:       Kidney stone on left side      Left ureteral stone      Ureteral stone with hydronephrosis      Renal colic on left side      (Kidney stone on left side [N20.0]Left ureteral stone [N20.1]Ureteral stone with hydronephrosis [N13.2]Renal colic on left side [N23])    Surgeons: Tanmay Sommer MD Anesthesiologist: Aditya Saeed MD    Anesthesia Type: general ASA Status: 2            Anesthesia Type: general    Vitals Value Taken Time   /73 12/13/24 1059   Temp 97.4 °F (36.3 °C) 12/13/24 1059   Pulse 71 12/13/24 1108   Resp 13 12/13/24 1108   SpO2 99 % 12/13/24 1108   Vitals shown include unfiled device data.    [facility] AN Post Evaluation:   Patient Evaluated in PACU  Patient Participation: complete - patient participated  Level of Consciousness: awake  Pain Score: 0  Pain Management: adequate  Airway Patency:patent  Dental exam unchanged from preop  Yes    Nausea/Vomiting: none  Cardiovascular Status: acceptable  Respiratory Status: acceptable  Postoperative Hydration acceptable      Belgica Arauz CRNA  12/13/2024 11:09 AM

## 2024-12-13 NOTE — ANESTHESIA PREPROCEDURE EVALUATION
Anesthesia PreOp Note    HPI:     Maribell Blackburn is a 54 year old female who presents for preoperative consultation requested by: Tanmay Sommer MD    Date of Surgery: 12/13/2024    Procedure(s):  Cystoscopy, left retrograde pyelogram, left ureteroscopy, laser lithotripsy, stone removal, ureteral stent insertion  CYSTOSCOPY URETEROSCOPY  CYSTOSCOPY STENT INSERTION  LASER HOLMIUM LITHOTRIPSY  Indication: Kidney stone on left side [N20.0]  Left ureteral stone [N20.1]  Ureteral stone with hydronephrosis [N13.2]  Renal colic on left side [N23]    Relevant Problems   No relevant active problems       NPO:  Last Liquid Consumption Date: 12/12/24     Last Solid Consumption Date: 12/12/24     Last Liquid Consumption Date: 12/12/24          History Review:  Patient Active Problem List    Diagnosis Date Noted    Ureteral stone with hydronephrosis 12/11/2024    Left ureteral stone 12/11/2024    Renal colic on left side 12/11/2024    Kidney stones 07/02/2015       Past Medical History:    Calculus of kidney    Hx of motion sickness    Menometrorrhagia    endometrial ablation - novasure    PONV (postoperative nausea and vomiting)    Sterilization    Visual impairment    glasses       Past Surgical History:   Procedure Laterality Date    Endometrial ablation  01/01/2009    novasure    Hysterectomy      bleeding issues during surgery    Laparoscopy,diagnostic      Other surgical history  01/01/2004    Per NG  Sterilization    Tubal ligation         Prescriptions Prior to Admission[1]  Current Medications and Prescriptions Ordered in Epic[2]    Allergies[3]    Family History   Problem Relation Age of Onset    Diabetes Father     Arthritis Father         osteoarthritis    Diabetes Mother     Other (Other) Mother         CKD    Lipids Mother     Hypertension Mother     Cancer Cousin 37        lung-smoker, \"Cousin-raised as si\"     Social History     Socioeconomic History    Marital status:    Tobacco Use    Smoking  status: Never    Smokeless tobacco: Never   Vaping Use    Vaping status: Never Used   Substance and Sexual Activity    Alcohol use: No    Drug use: Never    Sexual activity: Yes     Partners: Male     Birth control/protection: Tubal Ligation   Other Topics Concern    Caffeine Concern Yes     Comment: coffee, 3 cups daily       Available pre-op labs reviewed.  Lab Results   Component Value Date    WBC 10.6 12/09/2024    RBC 4.78 12/09/2024    HGB 14.0 12/09/2024    HCT 39.7 12/09/2024    MCV 83.1 12/09/2024    MCH 29.3 12/09/2024    MCHC 35.3 12/09/2024    RDW 12.7 12/09/2024    .0 12/09/2024     Lab Results   Component Value Date     12/09/2024    K 4.1 12/09/2024     12/09/2024    CO2 24.0 12/09/2024    BUN 18 12/09/2024    CREATSERUM 0.53 (L) 12/09/2024     (H) 12/09/2024    CA 9.7 12/09/2024     Lab Results   Component Value Date    INR 1.01 12/11/2024       Vital Signs:  Body mass index is 27.46 kg/m².   height is 1.651 m (5' 5\") and weight is 74.8 kg (165 lb). Her oral temperature is 98.1 °F (36.7 °C). Her blood pressure is 142/69 and her pulse is 54. Her respiration is 16 and oxygen saturation is 100%.   Vitals:    12/12/24 1001 12/13/24 0715   BP:  142/69   Pulse:  54   Resp:  16   Temp:  98.1 °F (36.7 °C)   TempSrc:  Oral   SpO2:  100%   Weight: 71.7 kg (158 lb) 74.8 kg (165 lb)   Height: 1.651 m (5' 5\") 1.651 m (5' 5\")        Anesthesia Evaluation     Patient summary reviewed and Nursing notes reviewed    No history of anesthetic complications   Airway   Mallampati: II  Neck ROM: full  Dental      Pulmonary - negative ROS and normal exam   Cardiovascular - negative ROS and normal exam    Neuro/Psych - negative ROS     GI/Hepatic/Renal - negative ROS     Endo/Other - negative ROS   Abdominal  - normal exam                 Anesthesia Plan:   ASA:  2  Plan:   General  Airway:  ETT  Post-op Pain Management: IV analgesics  Informed Consent Plan and Risks Discussed With:   Patient  Discussed plan with:  CRNA      I have informed Maribell Blackburn and/or legal guardian or family member of the nature of the anesthetic plan, benefits, risks including possible dental damage if relevant, major complications, and any alternative forms of anesthetic management.   All of the patient's questions were answered to the best of my ability. The patient desires the anesthetic management as planned.  Aditya Saeed MD  12/13/2024 8:07 AM  Present on Admission:   Left ureteral stone           [1]   Medications Prior to Admission   Medication Sig Dispense Refill Last Dose/Taking    Cholecalciferol (VITAMIN D3) 25 MCG (1000 UT) Oral Cap Take 1 tablet by mouth daily.   Past Week    B Complex Vitamins (VITAMIN B COMPLEX) Oral Cap Take 1 capsule by mouth daily.   Past Week    HYDROcodone-acetaminophen 5-325 MG Oral Tab Take 1 tablet by mouth every 6 (six) hours as needed for Pain. 20 tablet 0 12/13/2024 at  6:00 AM   [2]   Current Facility-Administered Medications Ordered in Epic   Medication Dose Route Frequency Provider Last Rate Last Admin    acetaminophen (Tylenol Extra Strength) tab 1,000 mg  1,000 mg Oral Once Tamnay Sommer MD        sodium chloride 0.9% infusion   Intravenous Continuous Tanmay Sommer MD 20 mL/hr at 12/13/24 0748 New Bag at 12/13/24 0748    cefTRIAXone (Rocephin) 1 g in sodium chloride 0.9% 100 mL IVPB-ADDV  1 g Intravenous Once Tanmay Sommer MD        gentamicin (Garamycin) 320 mg in sodium chloride 0.9% 100 mL IVPB  5 mg/kg (Adjusted) Intravenous Once Tanmay Sommer MD         No current [facility]-ordered outpatient medications on file.   [3]   Allergies  Allergen Reactions    Morphine OTHER (SEE COMMENTS)     Difficulty breathing had to be put on oxygen

## 2024-12-13 NOTE — OPERATIVE REPORT
[facility]  part of [facility]  Urology Operative Note         Maribell Blackburn Location: OR   [facility] [id number] MRN [id number]   Admission Date 12/13/2024 Operation Date 12/13/2024   Attending Physician Tanmay Sommer MD       Patient Name: Maribell Blackburn     Preoperative Diagnosis:   1. Left ureteral stone [N20.1]  2. Ureteral stone with hydronephrosis [N13.2]  3. Renal colic on left side [N23]     Postoperative Diagnosis:   1. Left ureteral stone [N20.1]  2. Ureteral stone with hydronephrosis [N13.2]  3. Renal colic on left side [N23]     Procedure(s): Cystoscopy, left retrograde pyelogram, left ureteroscopy, laser lithotripsy, stone removal, ureteral stent insertion.    Primary Surgeon: Tanmay Sommer MD     Anesthesia: General ETT.    Specimen:   ID Type Source Tests Collected by Time Destination   2 : 2. Left ureteral stone fragments Calculus Stone (calculus) CALCULI, URINARY, SURGICAL PATHOLOGY TISSUE Tanmay Sommer MD 12/13/2024 10:20 AM    A : 1. Urine from left kidney for culture Body fluid, unspecified Urine, left ureter URINE CULTURE, ROUTINE Tanmay Sommer MD 12/13/2024 10:39 AM         Estimated Blood Loss: None.   Complications: none.     Indications for procedure: 54-year-old female who was recently diagnosed with an 8 mm obstructing left proximal ureteral stone.  She has been symptomatic.  Management options were discussed and she elected to proceed with the above-mentioned procedure.  We discussed rationale, approach, benefits, risks, possible complications, and reasonable alternatives to treatment.  We discussed surgical risks including not limited to medical and anesthetic complications, bleeding, infection, damage to surrounding organs or structures, possible need for additional procedures.  We discussed eventual need for stent removal once stone treatment is completed.  She verbalized understanding and wishes to proceed.    Surgical Findings: Left proximal ureteral  stone.  Stone was completely fragmented using holmium laser.  Stone fragments were removed using Nitinol basket.  Moderate left hydronephrosis.  6 Burkinan by 28 cm double-J left ureteral stent inserted.    Operative Summary:  The patient was brought to the operating room and identified by their wristband including their name, medical record number, and date of birth. They were transferred to the operating room table. Appropriate monitoring devices were connected to the patient. SCD's were applied to the bilateral lower extremities for DVT prophylaxis. Successful induction of general level endotracheal anesthesia was achieved. IV antibiotics were administered for surgical prophylaxis. The patient was then positioned in dorsal lithotomy with their legs in Mir stirrups and all pressure areas and bony prominences padded appropriately. The surgical site was prepped and draped in standard sterile fashion. A full surgical timeout was performed with agreement upon all of its components.      A 22 Burkinan cystoscope with a 30 degree lens was inserted per urethra and advanced to the bladder.  Pan cystoscopy revealed no evidence of bladder tumors, masses, stones, or lesions.  The UOs were identified in their orthotopic locations.  A 0.035 sensor guidewire was introduced into the left ureteral orifice and advanced under fluoroscopic guidance to the level of the left kidney.  This was followed by a 5 Burkinan open-ended catheter which was reintroduced over the wire and advanced to the level of the left kidney.  The guidewire was removed.  A hydronephrotic drip was appreciated.  The urine sample was aspirated from the left renal pelvis and sent for cultures.  Left retrograde pyelography was then performed by gently introducing contrast into the collecting system.  There was evidence of moderate left hydroureteronephrosis.  No contrast extravasation or filling defects.  There was a mildly radiopaque stone in the proximal left  ureter.  The guidewire was replaced and the open-ended catheter and cystoscope were removed while maintaining the wire in place.    A [employer] 8/10 Malaysian ureteral dilator set was then sequentially introduced over the guidewire and advanced under fluoroscopic guidance to dilate the left ureter.  A second sensor wire was introduced through the 10 Malaysian sheath.  The 10 Malaysian sheath was then removed while maintaining both wires in place.  One of the wires was secured as a safety wire.    I then introduced a [employer] navigator 11/13 Malaysian by 28 cm ureteral access sheath over the working wire and advanced it under fluoroscopic guidance to the proximal left ureter.  No resistance was met.  The inner sheath and wire were removed.  A [employer] digital flexible ureteroscope was introduced and flexible left nephro uteroscopy was performed.  I was easily able to identify a stone within the proximal left ureter.  It had jagged edges and measured about 8 mm in size corresponding to CT findings.  A 200 µm [employer] holmium laser fiber was introduced and laser lithotripsy was then performed using different settings, achieving complete fragmentation of the stone.  A 1.9 Malaysian 0 tip nitinol basket was introduced and the stone fragments were engaged and removed through the access sheath and sent for chemical analysis.  All residual stone fragments were about the size of the laser tip and deemed passable per visual estimate.  Nephroscopy did not reveal any evidence of tumors, masses, additional stones or lesions.    At this point, the ureteroscope and access sheath were then withdrawn while maintaining the safety wire in place.  Exit ureteroscopy revealed no evidence of ureteral injury, masses, stones, or lesions.    The guidewire was backloaded onto the cystoscope which was reintroduced into the bladder.  A [employer] 6 Malaysian by 28 cm double-J ureteral stent was inserted over the guidewire and advanced under  fluoroscopic and visual guidance to the level of the left kidney.  The guidewire was removed.  Proximal distal coils were formed under fluoroscopic and visual guidance, respectively.  They appeared appropriate.  Excellent drainage was noted through the stent.    The bladder was emptied and the cystoscope was removed.  10 mL of 2% lidocaine jelly were instilled per urethra for postoperative analgesia.  This concluded our procedure.  Patient was repositioned supine, general anesthesia was reversed and she was successfully extubated and transported to the recovery room in a stable condition.  She tolerated the procedure well without any immediate complications.       Implants:   Implant Name Type Inv. Item Serial No.  Lot No. LRB No. Used Action   STENT URET [id number] ASCERTA - SN/A  STENT URET [id number] ASCERTA N/A [employer] [id number] Left 1 Implanted        Drains: Uruguayan by 28 cm double-J left ureteral stent.    Condition: Extubated and stable to PACU.    I was present, scrubbed, and performed the procedure in its entirety.  At patient's request, findings were discussed with her  following the procedure.      Tanmay Sommer MD

## 2024-12-13 NOTE — INTERVAL H&P NOTE
Pre-op Diagnosis: Kidney stone on left side [N20.0]  Left ureteral stone [N20.1]  Ureteral stone with hydronephrosis [N13.2]  Renal colic on left side [N23]    The above referenced H&P was reviewed by Tanmay Sommer MD on 12/13/2024, the patient was examined and no significant changes have occurred in the patient's condition since the H&P was performed.  I discussed with the patient and/or legal representative the potential benefits, risks and side effects of this procedure; the likelihood of the patient achieving goals; and potential problems that might occur during recuperation.  I discussed reasonable alternatives to the procedure, including risks, benefits and side effects related to the alternatives and risks related to not receiving this procedure.  We will proceed with procedure as planned.

## 2024-12-13 NOTE — ANESTHESIA PROCEDURE NOTES
Airway  Date/Time: 12/13/2024 9:59 AM  Urgency: Elective      General Information and Staff    Patient location during procedure: OR  Anesthesiologist: Aditya Saeed MD  Resident/CRNA: Belgica Arauz CRNA  Performed: CRNA   Performed by: Belgica Arauz CRNA  Authorized by: Aditya Saeed MD      Indications and Patient Condition  Indications for airway management: anesthesia  Sedation level: deep  Preoxygenated: yes  Patient position: sniffing  Mask difficulty assessment: 1 - vent by mask    Final Airway Details  Final airway type: endotracheal airway      Successful airway: ETT  Cuffed: yes   Successful intubation technique: Video laryngoscopy  Endotracheal tube insertion site: oral  Blade size: #3  ETT size (mm): 7.5    Cormack-Lehane Classification: grade I - full view of glottis  Placement verified by: capnometry   Cuff volume (mL): 8  Measured from: lips  ETT to lips (cm): 22  Number of attempts at approach: 1

## 2024-12-16 ENCOUNTER — TELEPHONE (OUTPATIENT)
Dept: SURGERY | Facility: CLINIC | Age: 54
End: 2024-12-16

## 2024-12-16 NOTE — TELEPHONE ENCOUNTER
Called patient, verified name and . Patient says she wants to come in to have the stent out as soon as possible because she is a lot of discomfort. I let patient know that the discomfort is very typical from the stent being in place. I also let her know that we were going to call her to schedule her for the stent removal the week of 2024 or week of 2024 per Dr. Sommer's order. I let patient to expect a call from us in a few days to schedule the cysto/stent removal once we find a procedure spot open.   Patient agreed, verbalized understandings and has no further questions.

## 2024-12-17 NOTE — TELEPHONE ENCOUNTER
Spoke with pt and offered appt for procedure for 12/23/24 at 11:00 am, but advised pt to arrive at 10:30 am and pt agreed. I explained the procedure to pt and pt verbalized understanding.

## 2024-12-18 LAB
CAOX DIHYDRATE: 70 %
CAOX MONOHYDRATE: 30 %
WEIGHT-STONE: 37 MG

## 2024-12-23 ENCOUNTER — PROCEDURE (OUTPATIENT)
Dept: SURGERY | Facility: CLINIC | Age: 54
End: 2024-12-23
Payer: COMMERCIAL

## 2024-12-23 VITALS — DIASTOLIC BLOOD PRESSURE: 84 MMHG | SYSTOLIC BLOOD PRESSURE: 132 MMHG

## 2024-12-23 DIAGNOSIS — N20.0 KIDNEY STONE ON LEFT SIDE: Primary | ICD-10-CM

## 2024-12-23 PROCEDURE — 3075F SYST BP GE 130 - 139MM HG: CPT | Performed by: UROLOGY

## 2024-12-23 PROCEDURE — 52310 CYSTOSCOPY AND TREATMENT: CPT | Performed by: UROLOGY

## 2024-12-23 PROCEDURE — 3079F DIAST BP 80-89 MM HG: CPT | Performed by: UROLOGY

## 2024-12-23 RX ORDER — CIPROFLOXACIN 500 MG/1
500 TABLET, FILM COATED ORAL ONCE
Status: COMPLETED | OUTPATIENT
Start: 2024-12-23 | End: 2024-12-23

## 2024-12-23 RX ADMIN — CIPROFLOXACIN 500 MG: 500 TABLET, FILM COATED ORAL at 11:41:00

## 2024-12-23 NOTE — PROGRESS NOTES
[facility] Urology  Follow-Up Visit    HPI: Maribell Blackburn is a 54 year old female presents for a follow up visit. Patient was last seen on 12/13/2024.    INTERVAL HISTORY: Patient underwent cystoscopy with left ureteroscopy, laser lithotripsy, stone removal and ureteral stent insertion on 12/13/2024 for management of an obstructing left proximal ureteral stone.    Stone analysis results revealing 30% calcium monohydrate and 70% calcium oxalate dihydrate composition.    Here for office cystoscopy and stent removal.    Reports some mild stent related discomfort.  No fevers or chills.      1.  Nephrolithiasis  Patient with previous history of nephrolithiasis back in 2015 where she passed a kidney stone.     She presented to the ER on 12/9/2024 complaining of sudden onset severe left lower quadrant abdominal pain.  Associated with nausea and vomiting.  It was colicky in nature.  It radiated to the upper abdomen.  No fevers or chills.  Noticed some gross hematuria.     Imaging included a CT scan of the abdomen and pelvis without contrast which revealed an 8 mm obstructing left proximal ureteral stone with moderate left hydroureteronephrosis.  Small volume perinephric fluid fluid and perinephric/periureteric inflammatory changes.     She has a normal WBC count and normal creatinine.     Urinalysis reveals 3+ blood, no leuks or nitrites.  Rare bacteria.     She was discharged home.     She has been having intermittent left-sided abdominal pain since then.  She has not passed the stone.  She is requiring Norco around-the-clock for her pain.  She denies fevers or chills.        PAST MEDICAL HISTORY: Nephrolithiasis     PAST SURGICAL HISTORY: Hysterectomy.  Laparoscopy.  Tubal ligation.  Left ureteroscopy with laser lithotripsy 12/13/2024.     SOCIAL HISTORY:  and has 2 children.  No smoking or illicit drug use.  No alcohol.  Works as a .     Reviewed past medical, surgical, family,  and social history.  Reviewed med list and allergies.      REVIEW OF SYSTEMS:  Pertinent positives and negatives per HPI. A 10-point ROS was performed and is otherwise negative.       EXAM:  /84 (BP Location: Left arm, Patient Position: Sitting, Cuff Size: adult)   LMP 06/13/2016 (Within Days)   Physical Exam  Constitutional:       General: She is not in acute distress.     Appearance: She is well-developed.   HENT:      Head: Normocephalic.   Eyes:      General: No scleral icterus.  Cardiovascular:      Rate and Rhythm: Normal rate.   Pulmonary:      Effort: Pulmonary effort is normal.   Skin:     General: Skin is warm and dry.   Neurological:      Mental Status: She is alert and oriented to person, place, and time.   Psychiatric:         Mood and Affect: Mood normal.         Behavior: Behavior normal.       PATHOLOGY:  See HPI      LABS:  No results found      IMAGING:  No results found.      UROLOGY PROCEDURE:    CYSTOURETHROSCOPY WITH STENT REMOVAL  Informed consent was obtained for the procedure.  A female chaperone was present.  Patient was prepped and draped in the usual sterile fashion.  An Ambu 16 [place] flexible cystoscope was utilized for the procedure.    Anesthesia:  2% lidocaine gel.    Urethra: Normal.    Bladder: Normal.  No tumor, stone, diverticulum, or glomerulation.    U.O's: Normal.  Ureteral stent emanating from the left UO.    Trabeculation: Not appreciated.    The distal coil of the left ureteral stent was visualized and grasped using cystoscopic graspers.  The stent and cystoscope were then gently removed under direct visualization through the urethral meatus.  The stent was gross identified to be intact and disposed off.  Patient tolerated procedure well.    POST CYSTOSCOPY MEDICATIONS: sample one tablet Cipro 500 mg given to patient.    DIAGNOSIS: Cystoscopy with successful removal of left ureteral stent.        IMPRESSION:  54 year old female with nephrolithiasis.    She underwent  left ureteroscopy with laser lithotripsy on 12/13/2024 for management of an obstructing, symptomatic 8 mm left proximal ureteral stone.    Stent removed in the office today without incident.  We will obtain a follow-up ultrasound of the kidneys to ensure resolution of hydronephrosis.    Stone analysis results reviewed with patient.  We discussed stone friendly dietary modifications to reduce future risk of stone formation.    All questions answered.      PLAN:  1.  Stone friendly diet.    2.  Obtain follow-up ultrasound of the kidneys to ensure resolution of hydronephrosis.  Patient will be notified of the results.      Tanmay Sommer MD  12/23/2024

## 2025-01-23 ENCOUNTER — HOSPITAL ENCOUNTER (OUTPATIENT)
Dept: ULTRASOUND IMAGING | Age: 55
Discharge: HOME | End: 2025-01-23
Attending: UROLOGY
Payer: COMMERCIAL

## 2025-01-23 DIAGNOSIS — N20.0 KIDNEY STONE ON LEFT SIDE: ICD-10-CM

## 2025-01-23 PROCEDURE — 76775 US EXAM ABDO BACK WALL LIM: CPT | Performed by: UROLOGY

## (undated) DEVICE — PACK CUSTOM CYSTO

## (undated) DEVICE — SNAP KOVER: Brand: UNBRANDED

## (undated) DEVICE — URETERAL ACCESS SHEATH SET: Brand: NAVIGATOR HD

## (undated) DEVICE — MEDI-VAC NON-CONDUCTIVE SUCTION TUBING: Brand: CARDINAL HEALTH

## (undated) DEVICE — ENDOSCOPIC VALVE WITH ADAPTER.: Brand: SURSEAL® II

## (undated) DEVICE — SOLUTION IRRIG 3000ML 0.9% NACL FLX CONT

## (undated) DEVICE — PAD,EYE,LARGE,2 1/8"X2 5/8",STERILE,LF: Brand: MEDLINE

## (undated) DEVICE — UROLOGY DRAIN BAG

## (undated) DEVICE — 3M™ TEGADERM™ HP TRANSPARENT FILM DRESSING FRAME STYLE, 9534HP, 2-3/8 X 2-3/4 IN (6 CM X 7 CM), 100/CT 4CT/CASE: Brand: 3M™ TEGADERM™

## (undated) DEVICE — 3M™ STERI-STRIP™ COMPOUND BENZOIN TINCTURE 40 BAGS/CARTON 4 CARTONS/CASE C1544: Brand: 3M™ STERI-STRIP™

## (undated) DEVICE — SLEEVE COMPR MD KNEE LEN SGL USE KENDALL SCD

## (undated) DEVICE — NITINOL WIRE WITH HYDROPHILIC TIP: Brand: SENSOR

## (undated) DEVICE — CATHETER URET 5FR L70CM FLX OPN TIP NONPORTED

## (undated) DEVICE — TOWEL,OR,DSP,ST,BLUE,DLX,2/PK,40PK/CS: Brand: MEDLINE

## (undated) DEVICE — GLOVE SUR 7.5 SENSICARE PI PIP CRM PWD F

## (undated) DEVICE — NITINOL STONE RETRIEVAL BASKET: Brand: ZERO TIP

## (undated) DEVICE — SOLUTION IRRIG 1000ML 0.9% NACL USP BTL

## (undated) DEVICE — DILATOR/SHEATH SET: Brand: 8/10 DILATOR/SHEATH SET

## (undated) DEVICE — CONTAINER,SPECIMEN,OR STERILE,4OZ: Brand: MEDLINE

## (undated) DEVICE — FIBER LSR 200UM 2J 80HZ 60W DL FOR LITHO

## (undated) DEVICE — SOLUTION IRRIG 1000ML ST H2O AQUALITE PLAS

## (undated) NOTE — LETTER
Date & Time: 10/2/2019, 11:42 AM  Patient: James Dolan  Encounter Provider(s):    Joby Arauz MD       To Whom It May Concern:    Zo [employer] was seen and treated in our department on 10/2/2019.  She should not return to work until 10/5/